# Patient Record
Sex: MALE | Race: WHITE | NOT HISPANIC OR LATINO | Employment: OTHER | URBAN - METROPOLITAN AREA
[De-identification: names, ages, dates, MRNs, and addresses within clinical notes are randomized per-mention and may not be internally consistent; named-entity substitution may affect disease eponyms.]

---

## 2018-06-13 NOTE — PATIENT DISCUSSION
Posterior Capsular Fibrosis Counseling: The diagnosis of posterior capsular fibrosis (PCF), also referred to as a secondary cataract or posterior capsular opacification (PCO), was discussed with the patient. The patient understands that their symptoms and limitations are likely related to this condition. I have reviewed the risks, benefits and alternatives of  YAG laser surgery for the treatment of the fibrosis. The uncommon risk of an increase in intraocular pressure or a retinal detachment and their associated symptoms were explained to the patient. The patient understands and desires to proceed with the laser surgery to improve their vision for driving at night with less glare.

## 2019-10-01 ENCOUNTER — RETINA CONSULT (OUTPATIENT)
Dept: URBAN - METROPOLITAN AREA CLINIC 46 | Facility: CLINIC | Age: 69
End: 2019-10-01

## 2019-10-01 DIAGNOSIS — H35.62: ICD-10-CM

## 2019-10-01 DIAGNOSIS — H43.12: ICD-10-CM

## 2019-10-01 DIAGNOSIS — H35.09: ICD-10-CM

## 2019-10-01 DIAGNOSIS — E11.3592: ICD-10-CM

## 2019-10-01 DIAGNOSIS — E11.3491: ICD-10-CM

## 2019-10-01 DIAGNOSIS — Z79.4: ICD-10-CM

## 2019-10-01 PROCEDURE — 76512 OPH US DX B-SCAN: CPT

## 2019-10-01 PROCEDURE — 9222550 BILAT EXTENDED OPHTHALMOSCOPY, FIRST

## 2019-10-01 PROCEDURE — 67028 INJECTION EYE DRUG: CPT

## 2019-10-01 PROCEDURE — 92014 COMPRE OPH EXAM EST PT 1/>: CPT

## 2019-10-01 PROCEDURE — J2778DME LUCENTIS 0.3MG

## 2019-10-01 PROCEDURE — 92250 FUNDUS PHOTOGRAPHY W/I&R: CPT

## 2019-10-01 RX ORDER — PREDNISOLONE ACETATE 10 MG/ML: 1 SUSPENSION/ DROPS OPHTHALMIC

## 2019-10-01 RX ORDER — OFLOXACIN 3 MG/ML: 1 SOLUTION OPHTHALMIC

## 2019-10-01 ASSESSMENT — TONOMETRY
OD_IOP_MMHG: 17
OS_IOP_MMHG: 16

## 2019-10-01 ASSESSMENT — VISUAL ACUITY: OD_CC: 20/60+1

## 2019-10-07 ENCOUNTER — FOLLOW UP (OUTPATIENT)
Dept: URBAN - METROPOLITAN AREA CLINIC 39 | Facility: CLINIC | Age: 69
End: 2019-10-07

## 2019-10-07 DIAGNOSIS — Z79.4: ICD-10-CM

## 2019-10-07 DIAGNOSIS — E11.3592: ICD-10-CM

## 2019-10-07 DIAGNOSIS — H43.12: ICD-10-CM

## 2019-10-07 DIAGNOSIS — E11.3491: ICD-10-CM

## 2019-10-07 DIAGNOSIS — H35.09: ICD-10-CM

## 2019-10-07 DIAGNOSIS — H35.62: ICD-10-CM

## 2019-10-07 PROCEDURE — 92134 CPTRZ OPH DX IMG PST SGM RTA: CPT

## 2019-10-07 PROCEDURE — 99214 OFFICE O/P EST MOD 30 MIN: CPT

## 2019-10-07 ASSESSMENT — TONOMETRY
OD_IOP_MMHG: 14
OS_IOP_MMHG: 21

## 2019-10-07 ASSESSMENT — VISUAL ACUITY: OD_CC: 20/60

## 2019-10-10 ENCOUNTER — SURGERY/PROCEDURE (OUTPATIENT)
Dept: URBAN - METROPOLITAN AREA CLINIC 46 | Facility: CLINIC | Age: 69
End: 2019-10-10

## 2019-10-10 DIAGNOSIS — H43.12: ICD-10-CM

## 2019-10-10 DIAGNOSIS — E11.3592: ICD-10-CM

## 2019-10-10 DIAGNOSIS — Z79.4: ICD-10-CM

## 2019-10-10 PROCEDURE — 67040 LASER TREATMENT OF RETINA: CPT

## 2019-10-11 ENCOUNTER — 1 DAY POST-OP (OUTPATIENT)
Dept: URBAN - METROPOLITAN AREA CLINIC 43 | Facility: CLINIC | Age: 69
End: 2019-10-11

## 2019-10-11 DIAGNOSIS — Z98.890: ICD-10-CM

## 2019-10-11 DIAGNOSIS — H43.12: ICD-10-CM

## 2019-10-11 PROCEDURE — 99024 POSTOP FOLLOW-UP VISIT: CPT

## 2019-10-11 ASSESSMENT — VISUAL ACUITY: OS_SC: 20/400

## 2019-10-11 ASSESSMENT — TONOMETRY: OS_IOP_MMHG: 09

## 2019-10-18 ENCOUNTER — POST-OP (OUTPATIENT)
Dept: URBAN - METROPOLITAN AREA CLINIC 43 | Facility: CLINIC | Age: 69
End: 2019-10-18

## 2019-10-18 DIAGNOSIS — Z98.890: ICD-10-CM

## 2019-10-18 PROCEDURE — 99024 POSTOP FOLLOW-UP VISIT: CPT

## 2019-10-18 ASSESSMENT — TONOMETRY: OS_IOP_MMHG: 20

## 2019-10-18 ASSESSMENT — VISUAL ACUITY
OD_CC: 20/70+2
OS_CC: 20/80

## 2019-11-26 ENCOUNTER — POST-OP (OUTPATIENT)
Dept: URBAN - METROPOLITAN AREA CLINIC 46 | Facility: CLINIC | Age: 69
End: 2019-11-26

## 2019-11-26 DIAGNOSIS — H43.12: ICD-10-CM

## 2019-11-26 DIAGNOSIS — H04.123: ICD-10-CM

## 2019-11-26 PROCEDURE — 99024 POSTOP FOLLOW-UP VISIT: CPT

## 2019-11-26 PROCEDURE — 92134 CPTRZ OPH DX IMG PST SGM RTA: CPT

## 2019-11-26 ASSESSMENT — TONOMETRY: OS_IOP_MMHG: 14

## 2019-11-26 ASSESSMENT — VISUAL ACUITY
OS_PH: 20/50
OS_CC: 20/70

## 2020-01-07 ENCOUNTER — POST-OP (OUTPATIENT)
Dept: URBAN - METROPOLITAN AREA CLINIC 46 | Facility: CLINIC | Age: 70
End: 2020-01-07

## 2020-01-07 DIAGNOSIS — H43.12: ICD-10-CM

## 2020-01-07 DIAGNOSIS — H04.123: ICD-10-CM

## 2020-01-07 PROCEDURE — 92134 CPTRZ OPH DX IMG PST SGM RTA: CPT

## 2020-01-07 PROCEDURE — 99024 POSTOP FOLLOW-UP VISIT: CPT

## 2020-01-07 ASSESSMENT — VISUAL ACUITY
OS_PH: 20/50+2
OS_CC: 20/80+2

## 2020-01-07 ASSESSMENT — TONOMETRY: OS_IOP_MMHG: 15

## 2021-02-12 NOTE — PATIENT DISCUSSION
The patient feels that the cataract is significantly impacting daily activities and has elected cataract surgery. The risks, benefits, and alternatives to surgery were discussed. The patient elects to proceed with surgery. patient elects basic shani.

## 2021-03-04 NOTE — PATIENT DISCUSSION
BLOCK ENDOCOAT.
Good postoperative appearance.
H&P essentially unchanged.
Indications, risks, benefits and alternatives to YAG capsulotomy discussed with patient. Questions answered. Educational handout given.
Patient elects to proceed with YAG capsulotomy.
Patient is cleared for anesthesia.
Patient made aware of 24/7 emergency services.
Symptoms of Retinal tear and detachment reviewed. Patient understands to call immediately with any such symptoms.
The patient feels that the cataract is significantly impacting daily activities and has elected cataract surgery. The risks, benefits, and alternatives to surgery were discussed. The patient elects to proceed with surgery. patient elects basic shani.
The types of intraocular lenses were reviewed with the patient along with a discussion of their various strengths and weaknesses.
basic OD goal shani  YAG OS same day.
negative...

## 2021-03-24 NOTE — PROCEDURE NOTE: SURGICAL
<p>Prior to commencing surgery patient identification, surgical procedure, site, and side were confirmed by Dr. Sandy Kaur. Following topical proparacaine anesthesia, the patient was positioned at the YAG laser, a contact lens coupled to the cornea with methylcellulose and an axial posterior capsulotomy performed without complication using 3.2 Mj x 17. Excess methylcellulose was washed from the eye, one drop of Alphagan was instilled and the patient returned to the holding area having tolerated the procedure well and without complication. </p><p> [id number]</p>

## 2021-06-08 ENCOUNTER — ESTABLISHED COMPREHENSIVE EXAM (OUTPATIENT)
Dept: URBAN - METROPOLITAN AREA CLINIC 46 | Facility: CLINIC | Age: 71
End: 2021-06-08

## 2021-06-08 DIAGNOSIS — H43.12: ICD-10-CM

## 2021-06-08 DIAGNOSIS — H35.62: ICD-10-CM

## 2021-06-08 DIAGNOSIS — E11.3592: ICD-10-CM

## 2021-06-08 DIAGNOSIS — E11.3491: ICD-10-CM

## 2021-06-08 DIAGNOSIS — Z79.4: ICD-10-CM

## 2021-06-08 DIAGNOSIS — H35.09: ICD-10-CM

## 2021-06-08 PROCEDURE — 92250 FUNDUS PHOTOGRAPHY W/I&R: CPT

## 2021-06-08 PROCEDURE — 99214 OFFICE O/P EST MOD 30 MIN: CPT

## 2021-06-08 PROCEDURE — 92273 FULL FIELD ERG W/I&R: CPT

## 2021-06-08 ASSESSMENT — TONOMETRY
OS_IOP_MMHG: 20
OD_IOP_MMHG: 19

## 2021-06-08 ASSESSMENT — VISUAL ACUITY
OS_PH: 20/60
OS_CC: 20/200-1
OD_CC: 20/40-2

## 2021-06-11 ENCOUNTER — CATARACT CONSULT (OUTPATIENT)
Dept: URBAN - METROPOLITAN AREA CLINIC 46 | Facility: CLINIC | Age: 71
End: 2021-06-11

## 2021-06-11 DIAGNOSIS — H40.1131: ICD-10-CM

## 2021-06-11 DIAGNOSIS — H04.123: ICD-10-CM

## 2021-06-11 DIAGNOSIS — H25.813: ICD-10-CM

## 2021-06-11 PROCEDURE — 92004 COMPRE OPH EXAM NEW PT 1/>: CPT

## 2021-06-11 PROCEDURE — 92133 CPTRZD OPH DX IMG PST SGM ON: CPT

## 2021-06-11 PROCEDURE — 92020 GONIOSCOPY: CPT

## 2021-06-11 PROCEDURE — 92136TC INTERFEROMETRY - TECHNICAL COMPONENT

## 2021-06-11 RX ORDER — MOXIFLOXACIN OPHTHALMIC 5 MG/ML: 1 SOLUTION/ DROPS OPHTHALMIC

## 2021-06-11 RX ORDER — PREDNISOLONE ACETATE 10 MG/ML: 1 SUSPENSION/ DROPS OPHTHALMIC

## 2021-06-11 ASSESSMENT — VISUAL ACUITY
OS_SC: J12
OS_AM: 20/40
OD_RAM: 20/30
OD_SC: 20/400
OD_CC: 20/40-1
OD_SC: J12
OS_SC: 20/400
OS_CC: 20/80
OD_BAT: 20/400
OD_CC: J2
OS_CC: J4
OS_BAT: 20/400

## 2021-06-11 ASSESSMENT — TONOMETRY
OD_IOP_MMHG: 18
OS_IOP_MMHG: 19

## 2021-07-12 ENCOUNTER — PRE-OP/H&P (OUTPATIENT)
Dept: URBAN - METROPOLITAN AREA CLINIC 39 | Facility: CLINIC | Age: 71
End: 2021-07-12

## 2021-07-12 ENCOUNTER — SURGERY/PROCEDURE (OUTPATIENT)
Dept: URBAN - METROPOLITAN AREA CLINIC 46 | Facility: CLINIC | Age: 71
End: 2021-07-12

## 2021-07-12 DIAGNOSIS — H40.1111: ICD-10-CM

## 2021-07-12 DIAGNOSIS — H25.813: ICD-10-CM

## 2021-07-12 DIAGNOSIS — H25.811: ICD-10-CM

## 2021-07-12 DIAGNOSIS — H04.123: ICD-10-CM

## 2021-07-12 DIAGNOSIS — H40.1131: ICD-10-CM

## 2021-07-12 DIAGNOSIS — H21.81: ICD-10-CM

## 2021-07-12 PROCEDURE — 99211HP H&P OFFICE/OUTPATIENT VISIT, EST

## 2021-07-12 PROCEDURE — 66984 XCAPSL CTRC RMVL W/O ECP: CPT

## 2021-07-12 PROCEDURE — 0191TW INJECT W ISTENT, AQUEOUS SHUNT

## 2021-07-13 ENCOUNTER — CATARACT POST-OP 1-DAY (OUTPATIENT)
Dept: URBAN - METROPOLITAN AREA CLINIC 46 | Facility: CLINIC | Age: 71
End: 2021-07-13

## 2021-07-13 DIAGNOSIS — Z96.1: ICD-10-CM

## 2021-07-13 PROCEDURE — 99024 POSTOP FOLLOW-UP VISIT: CPT

## 2021-07-13 ASSESSMENT — VISUAL ACUITY
OS_SC: 20/80
OD_SC: J6
OD_SC: 20/40-1
OS_SC: J10

## 2021-07-13 ASSESSMENT — TONOMETRY: OD_IOP_MMHG: 16

## 2021-07-19 ENCOUNTER — SURGERY/PROCEDURE (OUTPATIENT)
Dept: URBAN - METROPOLITAN AREA CLINIC 36 | Facility: CLINIC | Age: 71
End: 2021-07-19

## 2021-07-19 ENCOUNTER — POST OP/EVAL OF SECOND EYE (OUTPATIENT)
Dept: URBAN - METROPOLITAN AREA CLINIC 39 | Facility: CLINIC | Age: 71
End: 2021-07-19

## 2021-07-19 DIAGNOSIS — H40.1121: ICD-10-CM

## 2021-07-19 DIAGNOSIS — H25.812: ICD-10-CM

## 2021-07-19 DIAGNOSIS — Z96.1: ICD-10-CM

## 2021-07-19 PROCEDURE — 0376T INSERTION OF ANTERIOR SEGMENT AQUEOUS DRAINAGE DEVICE, WITHOUT EXTRAOCULAR RESERVOIR, INTERNAL APPROACH, INTO THE TRABECULAR MESHWORK; EACH ADDITIONAL DEVICE INSERTION (LIST SEPARATELY IN ADDITION TO CODE FOR PRIMARY PROCEDURE): CPT

## 2021-07-19 PROCEDURE — 0191TW INJECT W ISTENT, AQUEOUS SHUNT

## 2021-07-19 PROCEDURE — 66984 XCAPSL CTRC RMVL W/O ECP: CPT

## 2021-07-19 PROCEDURE — 99211HP H&P OFFICE/OUTPATIENT VISIT, EST

## 2021-07-19 ASSESSMENT — VISUAL ACUITY
OD_PH: 20/40-1
OD_SC: 20/60-2

## 2021-07-19 ASSESSMENT — TONOMETRY: OD_IOP_MMHG: 19

## 2021-07-20 ENCOUNTER — CATARACT POST-OP 1-DAY (OUTPATIENT)
Dept: URBAN - METROPOLITAN AREA CLINIC 46 | Facility: CLINIC | Age: 71
End: 2021-07-20

## 2021-07-20 DIAGNOSIS — Z96.1: ICD-10-CM

## 2021-07-20 PROCEDURE — 99024 POSTOP FOLLOW-UP VISIT: CPT

## 2021-07-20 ASSESSMENT — TONOMETRY: OS_IOP_MMHG: 22

## 2021-07-20 ASSESSMENT — VISUAL ACUITY
OS_SC: 20/100
OS_SC: <J10
OD_SC: J6
OD_SC: 20/60-2

## 2021-07-27 ENCOUNTER — POST-OP CATARACT (OUTPATIENT)
Dept: URBAN - METROPOLITAN AREA CLINIC 46 | Facility: CLINIC | Age: 71
End: 2021-07-27

## 2021-07-27 ENCOUNTER — ESTABLISHED PATIENT (OUTPATIENT)
Dept: URBAN - METROPOLITAN AREA CLINIC 36 | Facility: CLINIC | Age: 71
End: 2021-07-27

## 2021-07-27 DIAGNOSIS — H43.11: ICD-10-CM

## 2021-07-27 DIAGNOSIS — H33.41: ICD-10-CM

## 2021-07-27 DIAGNOSIS — H43.12: ICD-10-CM

## 2021-07-27 DIAGNOSIS — E11.3591: ICD-10-CM

## 2021-07-27 DIAGNOSIS — H43.811: ICD-10-CM

## 2021-07-27 DIAGNOSIS — Z96.1: ICD-10-CM

## 2021-07-27 DIAGNOSIS — Z79.4: ICD-10-CM

## 2021-07-27 DIAGNOSIS — H35.033: ICD-10-CM

## 2021-07-27 DIAGNOSIS — E11.3592: ICD-10-CM

## 2021-07-27 PROCEDURE — 76512 OPH US DX B-SCAN: CPT

## 2021-07-27 PROCEDURE — 99214 OFFICE O/P EST MOD 30 MIN: CPT

## 2021-07-27 PROCEDURE — 99024 POSTOP FOLLOW-UP VISIT: CPT

## 2021-07-27 PROCEDURE — 92250 FUNDUS PHOTOGRAPHY W/I&R: CPT

## 2021-07-27 PROCEDURE — J2778DME LUCENTIS 0.3MG

## 2021-07-27 PROCEDURE — 67028 INJECTION EYE DRUG: CPT

## 2021-07-27 RX ORDER — MOXIFLOXACIN OPHTHALMIC 5 MG/ML
1 SOLUTION/ DROPS OPHTHALMIC
Start: 2021-07-27

## 2021-07-27 RX ORDER — OFLOXACIN 3 MG/ML: 1 SOLUTION OPHTHALMIC

## 2021-07-27 ASSESSMENT — VISUAL ACUITY
OD_SC: 20/70-1
OS_SC: 20/100
OD_SC: <J10
OS_SC: J10
OS_SC: 20/100
OD_SC: 20/70-1
OD_SC: J10
OS_SC: J10

## 2021-07-27 ASSESSMENT — TONOMETRY
OD_IOP_MMHG: 19
OS_IOP_MMHG: 18

## 2021-07-28 ENCOUNTER — PRE-OP/H&P (OUTPATIENT)
Dept: URBAN - METROPOLITAN AREA CLINIC 39 | Facility: CLINIC | Age: 71
End: 2021-07-28

## 2021-07-28 DIAGNOSIS — E11.3592: ICD-10-CM

## 2021-07-28 DIAGNOSIS — H35.033: ICD-10-CM

## 2021-07-28 DIAGNOSIS — Z79.4: ICD-10-CM

## 2021-07-28 DIAGNOSIS — H43.811: ICD-10-CM

## 2021-07-28 DIAGNOSIS — H35.09: ICD-10-CM

## 2021-07-28 DIAGNOSIS — Z96.1: ICD-10-CM

## 2021-07-28 DIAGNOSIS — H33.41: ICD-10-CM

## 2021-07-28 DIAGNOSIS — E11.3591: ICD-10-CM

## 2021-07-28 DIAGNOSIS — H43.12: ICD-10-CM

## 2021-07-28 DIAGNOSIS — S05.02XA: ICD-10-CM

## 2021-07-28 DIAGNOSIS — H43.11: ICD-10-CM

## 2021-07-28 PROCEDURE — 99211HP H&P OFFICE/OUTPATIENT VISIT, EST

## 2021-07-29 ENCOUNTER — SURGERY/PROCEDURE (OUTPATIENT)
Dept: URBAN - METROPOLITAN AREA CLINIC 36 | Facility: CLINIC | Age: 71
End: 2021-07-29

## 2021-07-29 DIAGNOSIS — H43.11: ICD-10-CM

## 2021-07-29 DIAGNOSIS — H33.41: ICD-10-CM

## 2021-07-29 PROCEDURE — 67113 REPAIR RETINAL DETACH CPLX: CPT

## 2021-07-30 ENCOUNTER — 1 DAY POST-OP (OUTPATIENT)
Dept: URBAN - METROPOLITAN AREA CLINIC 43 | Facility: CLINIC | Age: 71
End: 2021-07-30

## 2021-07-30 DIAGNOSIS — H43.11: ICD-10-CM

## 2021-07-30 DIAGNOSIS — Z96.1: ICD-10-CM

## 2021-07-30 DIAGNOSIS — H33.41: ICD-10-CM

## 2021-07-30 PROCEDURE — 99024 POSTOP FOLLOW-UP VISIT: CPT

## 2021-07-30 ASSESSMENT — TONOMETRY: OD_IOP_MMHG: 16

## 2021-07-30 ASSESSMENT — VISUAL ACUITY: OD_SC: CF 2FT

## 2021-08-06 ENCOUNTER — POST-OP (OUTPATIENT)
Dept: URBAN - METROPOLITAN AREA CLINIC 43 | Facility: CLINIC | Age: 71
End: 2021-08-06

## 2021-08-06 DIAGNOSIS — H43.12: ICD-10-CM

## 2021-08-06 DIAGNOSIS — H35.033: ICD-10-CM

## 2021-08-06 DIAGNOSIS — Z79.4: ICD-10-CM

## 2021-08-06 DIAGNOSIS — H33.41: ICD-10-CM

## 2021-08-06 DIAGNOSIS — E11.3591: ICD-10-CM

## 2021-08-06 PROCEDURE — 99024 POSTOP FOLLOW-UP VISIT: CPT

## 2021-08-06 PROCEDURE — 92134 CPTRZ OPH DX IMG PST SGM RTA: CPT

## 2021-08-06 PROCEDURE — 92250 FUNDUS PHOTOGRAPHY W/I&R: CPT

## 2021-08-06 ASSESSMENT — TONOMETRY: OD_IOP_MMHG: 18

## 2021-08-06 ASSESSMENT — VISUAL ACUITY: OD_SC: 20/50+2

## 2021-08-13 ENCOUNTER — POST-OP CATARACT (OUTPATIENT)
Dept: URBAN - METROPOLITAN AREA CLINIC 46 | Facility: CLINIC | Age: 71
End: 2021-08-13

## 2021-08-13 DIAGNOSIS — Z96.1: ICD-10-CM

## 2021-08-13 PROCEDURE — 99024 POSTOP FOLLOW-UP VISIT: CPT

## 2021-08-13 ASSESSMENT — VISUAL ACUITY
OS_SC: >J12
OD_SC: >J12
OS_SC: 20/60+2
OD_SC: 20/40-1

## 2021-08-13 ASSESSMENT — TONOMETRY
OD_IOP_MMHG: 18
OS_IOP_MMHG: 19

## 2021-09-20 ENCOUNTER — POST-OP (OUTPATIENT)
Dept: URBAN - METROPOLITAN AREA CLINIC 43 | Facility: CLINIC | Age: 71
End: 2021-09-20

## 2021-09-20 DIAGNOSIS — Z79.4: ICD-10-CM

## 2021-09-20 DIAGNOSIS — E11.3513: ICD-10-CM

## 2021-09-20 DIAGNOSIS — H35.033: ICD-10-CM

## 2021-09-20 DIAGNOSIS — H33.41: ICD-10-CM

## 2021-09-20 DIAGNOSIS — H43.12: ICD-10-CM

## 2021-09-20 PROCEDURE — J2778DME LUCENTIS 0.3MG

## 2021-09-20 PROCEDURE — 92202 OPSCPY EXTND ON/MAC DRAW: CPT

## 2021-09-20 PROCEDURE — 99214 OFFICE O/P EST MOD 30 MIN: CPT

## 2021-09-20 PROCEDURE — 6702850 BILATERAL INTRAVITREAL INJECTION

## 2021-09-20 PROCEDURE — 92134 CPTRZ OPH DX IMG PST SGM RTA: CPT

## 2021-09-20 ASSESSMENT — TONOMETRY
OD_IOP_MMHG: 19
OS_IOP_MMHG: 20

## 2021-09-20 ASSESSMENT — VISUAL ACUITY
OD_PH: 20/70--
OD_SC: 20/100+
OS_SC: 20/100

## 2021-10-26 ENCOUNTER — ESTABLISHED PATIENT (OUTPATIENT)
Dept: URBAN - METROPOLITAN AREA CLINIC 46 | Facility: CLINIC | Age: 71
End: 2021-10-26

## 2021-10-26 DIAGNOSIS — Z79.4: ICD-10-CM

## 2021-10-26 DIAGNOSIS — H35.033: ICD-10-CM

## 2021-10-26 DIAGNOSIS — E11.3512: ICD-10-CM

## 2021-10-26 DIAGNOSIS — E11.3511: ICD-10-CM

## 2021-10-26 PROCEDURE — 6702850 BILATERAL INTRAVITREAL INJECTION

## 2021-10-26 PROCEDURE — 92273 FULL FIELD ERG W/I&R: CPT

## 2021-10-26 PROCEDURE — 99213 OFFICE O/P EST LOW 20 MIN: CPT

## 2021-10-26 PROCEDURE — J2778DME LUCENTIS 0.3MG

## 2021-10-26 PROCEDURE — 92134 CPTRZ OPH DX IMG PST SGM RTA: CPT

## 2021-10-26 ASSESSMENT — TONOMETRY
OS_IOP_MMHG: 16
OD_IOP_MMHG: 15

## 2021-10-26 ASSESSMENT — VISUAL ACUITY
OD_CC: 20/40-1
OS_CC: 20/200

## 2021-11-18 ENCOUNTER — FOLLOW UP (OUTPATIENT)
Dept: URBAN - METROPOLITAN AREA CLINIC 46 | Facility: CLINIC | Age: 71
End: 2021-11-18

## 2021-11-18 DIAGNOSIS — H40.1131: ICD-10-CM

## 2021-11-18 DIAGNOSIS — H35.033: ICD-10-CM

## 2021-11-18 PROCEDURE — 76514 ECHO EXAM OF EYE THICKNESS: CPT

## 2021-11-18 PROCEDURE — 92012 INTRM OPH EXAM EST PATIENT: CPT

## 2021-11-18 PROCEDURE — 92083 EXTENDED VISUAL FIELD XM: CPT

## 2021-11-18 ASSESSMENT — TONOMETRY
OD_IOP_MMHG: 13
OS_IOP_MMHG: 15

## 2021-11-18 ASSESSMENT — VISUAL ACUITY
OD_CC: 20/40-1
OS_PH: 20/70
OS_CC: 20/200+1

## 2021-11-18 ASSESSMENT — PACHYMETRY
OS_CT_UM: 580
OD_CT_UM: 550

## 2021-12-07 ENCOUNTER — ESTABLISHED PATIENT (OUTPATIENT)
Dept: URBAN - METROPOLITAN AREA CLINIC 46 | Facility: CLINIC | Age: 71
End: 2021-12-07

## 2021-12-07 DIAGNOSIS — E11.3511: ICD-10-CM

## 2021-12-07 DIAGNOSIS — H35.033: ICD-10-CM

## 2021-12-07 DIAGNOSIS — E11.3512: ICD-10-CM

## 2021-12-07 DIAGNOSIS — H43.12: ICD-10-CM

## 2021-12-07 DIAGNOSIS — Z79.4: ICD-10-CM

## 2021-12-07 DIAGNOSIS — H33.41: ICD-10-CM

## 2021-12-07 PROCEDURE — 6702850 BILATERAL INTRAVITREAL INJECTION

## 2021-12-07 PROCEDURE — 99213 OFFICE O/P EST LOW 20 MIN: CPT

## 2021-12-07 PROCEDURE — J2778DME LUCENTIS 0.3MG

## 2021-12-07 PROCEDURE — 92250 FUNDUS PHOTOGRAPHY W/I&R: CPT

## 2021-12-07 ASSESSMENT — VISUAL ACUITY
OD_SC: 20/30-2
OS_SC: 20/30-2

## 2021-12-07 ASSESSMENT — TONOMETRY
OS_IOP_MMHG: 20
OD_IOP_MMHG: 21

## 2021-12-22 ENCOUNTER — FOLLOW UP (OUTPATIENT)
Dept: URBAN - METROPOLITAN AREA CLINIC 46 | Facility: CLINIC | Age: 71
End: 2021-12-22

## 2021-12-22 DIAGNOSIS — H35.033: ICD-10-CM

## 2021-12-22 DIAGNOSIS — H40.1131: ICD-10-CM

## 2021-12-22 PROCEDURE — 92012 INTRM OPH EXAM EST PATIENT: CPT

## 2021-12-22 ASSESSMENT — VISUAL ACUITY
OS_SC: 20/30-2
OD_SC: 20/40-2

## 2021-12-22 ASSESSMENT — TONOMETRY
OS_IOP_MMHG: 16
OD_IOP_MMHG: 15

## 2022-01-25 ENCOUNTER — ESTABLISHED PATIENT (OUTPATIENT)
Dept: URBAN - METROPOLITAN AREA CLINIC 46 | Facility: CLINIC | Age: 72
End: 2022-01-25

## 2022-01-25 DIAGNOSIS — E11.3511: ICD-10-CM

## 2022-01-25 DIAGNOSIS — Z79.4: ICD-10-CM

## 2022-01-25 DIAGNOSIS — H43.12: ICD-10-CM

## 2022-01-25 DIAGNOSIS — H35.033: ICD-10-CM

## 2022-01-25 DIAGNOSIS — H33.41: ICD-10-CM

## 2022-01-25 DIAGNOSIS — E11.3512: ICD-10-CM

## 2022-01-25 PROCEDURE — 92134 CPTRZ OPH DX IMG PST SGM RTA: CPT

## 2022-01-25 PROCEDURE — 92273 FULL FIELD ERG W/I&R: CPT

## 2022-01-25 PROCEDURE — J2778DME LUCENTIS 0.3MG

## 2022-01-25 PROCEDURE — 99213 OFFICE O/P EST LOW 20 MIN: CPT

## 2022-01-25 PROCEDURE — 6702850 BILATERAL INTRAVITREAL INJECTION

## 2022-01-25 ASSESSMENT — VISUAL ACUITY
OD_CC: 20/40+2
OS_CC: 20/40-2

## 2022-01-25 ASSESSMENT — TONOMETRY
OD_IOP_MMHG: 19
OS_IOP_MMHG: 19

## 2022-03-15 ENCOUNTER — ESTABLISHED PATIENT (OUTPATIENT)
Dept: URBAN - METROPOLITAN AREA CLINIC 46 | Facility: CLINIC | Age: 72
End: 2022-03-15

## 2022-03-15 DIAGNOSIS — H40.1131: ICD-10-CM

## 2022-03-15 DIAGNOSIS — E11.3511: ICD-10-CM

## 2022-03-15 DIAGNOSIS — H35.09: ICD-10-CM

## 2022-03-15 DIAGNOSIS — Z79.4: ICD-10-CM

## 2022-03-15 DIAGNOSIS — H35.033: ICD-10-CM

## 2022-03-15 DIAGNOSIS — H33.41: ICD-10-CM

## 2022-03-15 DIAGNOSIS — E11.3512: ICD-10-CM

## 2022-03-15 PROCEDURE — J2778DME LUCENTIS 0.3MG

## 2022-03-15 PROCEDURE — 92134 CPTRZ OPH DX IMG PST SGM RTA: CPT

## 2022-03-15 PROCEDURE — 99214 OFFICE O/P EST MOD 30 MIN: CPT

## 2022-03-15 PROCEDURE — 67028 INJECTION EYE DRUG: CPT

## 2022-03-15 ASSESSMENT — VISUAL ACUITY
OS_CC: 20/25-1
OD_CC: 20/30-1

## 2022-03-15 ASSESSMENT — TONOMETRY
OS_IOP_MMHG: 19
OD_IOP_MMHG: 15

## 2022-05-10 ENCOUNTER — ESTABLISHED PATIENT (OUTPATIENT)
Dept: URBAN - METROPOLITAN AREA CLINIC 46 | Facility: CLINIC | Age: 72
End: 2022-05-10

## 2022-05-10 DIAGNOSIS — H43.12: ICD-10-CM

## 2022-05-10 DIAGNOSIS — H35.09: ICD-10-CM

## 2022-05-10 DIAGNOSIS — Z79.4: ICD-10-CM

## 2022-05-10 DIAGNOSIS — E11.3513: ICD-10-CM

## 2022-05-10 DIAGNOSIS — H35.033: ICD-10-CM

## 2022-05-10 DIAGNOSIS — H33.41: ICD-10-CM

## 2022-05-10 PROCEDURE — 92250 FUNDUS PHOTOGRAPHY W/I&R: CPT

## 2022-05-10 PROCEDURE — 99214 OFFICE O/P EST MOD 30 MIN: CPT

## 2022-05-10 PROCEDURE — 6702850 BILATERAL INTRAVITREAL INJECTION

## 2022-05-10 PROCEDURE — 92273 FULL FIELD ERG W/I&R: CPT

## 2022-05-10 PROCEDURE — J0178PRE EYLEA PREFILLED SYRINGE

## 2022-05-10 ASSESSMENT — VISUAL ACUITY
OS_CC: 20/30-2
OD_CC: 20/50-2

## 2022-05-10 ASSESSMENT — TONOMETRY
OD_IOP_MMHG: 19
OS_IOP_MMHG: 19

## 2022-05-19 ENCOUNTER — FOLLOW UP (OUTPATIENT)
Dept: URBAN - METROPOLITAN AREA CLINIC 46 | Facility: CLINIC | Age: 72
End: 2022-05-19

## 2022-05-19 DIAGNOSIS — H40.1131: ICD-10-CM

## 2022-05-19 PROCEDURE — 92012 INTRM OPH EXAM EST PATIENT: CPT

## 2022-05-19 PROCEDURE — 92133 CPTRZD OPH DX IMG PST SGM ON: CPT

## 2022-05-19 ASSESSMENT — VISUAL ACUITY
OD_CC: 20/50
OS_CC: 20/30-2

## 2022-05-19 ASSESSMENT — TONOMETRY
OD_IOP_MMHG: 19
OS_IOP_MMHG: 18

## 2022-06-21 ENCOUNTER — CLINIC PROCEDURE ONLY (OUTPATIENT)
Dept: URBAN - METROPOLITAN AREA CLINIC 46 | Facility: CLINIC | Age: 72
End: 2022-06-21

## 2022-06-21 DIAGNOSIS — E11.3513: ICD-10-CM

## 2022-06-21 DIAGNOSIS — H35.033: ICD-10-CM

## 2022-06-21 DIAGNOSIS — Z79.4: ICD-10-CM

## 2022-06-21 PROCEDURE — 92250 FUNDUS PHOTOGRAPHY W/I&R: CPT

## 2022-06-21 PROCEDURE — 6702850 BILATERAL INTRAVITREAL INJECTION

## 2022-06-21 PROCEDURE — J0178PRE EYLEA PREFILLED SYRINGE

## 2022-06-21 ASSESSMENT — TONOMETRY
OD_IOP_MMHG: 17
OS_IOP_MMHG: 19

## 2022-06-21 ASSESSMENT — VISUAL ACUITY
OD_CC: 20/60+2
OS_CC: 20/40

## 2022-07-19 ENCOUNTER — ESTABLISHED PATIENT (OUTPATIENT)
Dept: URBAN - METROPOLITAN AREA CLINIC 46 | Facility: CLINIC | Age: 72
End: 2022-07-19

## 2022-07-19 DIAGNOSIS — H33.41: ICD-10-CM

## 2022-07-19 DIAGNOSIS — H43.12: ICD-10-CM

## 2022-07-19 DIAGNOSIS — Z79.4: ICD-10-CM

## 2022-07-19 DIAGNOSIS — E11.3513: ICD-10-CM

## 2022-07-19 DIAGNOSIS — H35.033: ICD-10-CM

## 2022-07-19 DIAGNOSIS — H04.123: ICD-10-CM

## 2022-07-19 DIAGNOSIS — H35.09: ICD-10-CM

## 2022-07-19 PROCEDURE — 99213 OFFICE O/P EST LOW 20 MIN: CPT

## 2022-07-19 PROCEDURE — 6702850 BILATERAL INTRAVITREAL INJECTION

## 2022-07-19 PROCEDURE — 92250 FUNDUS PHOTOGRAPHY W/I&R: CPT

## 2022-07-19 PROCEDURE — J0178PRE EYLEA PREFILLED SYRINGE

## 2022-07-19 ASSESSMENT — TONOMETRY
OD_IOP_MMHG: 16
OS_IOP_MMHG: 17

## 2022-07-19 ASSESSMENT — VISUAL ACUITY
OS_CC: 20/25-2
OD_CC: 20/60-2

## 2022-08-16 ENCOUNTER — CLINIC PROCEDURE ONLY (OUTPATIENT)
Dept: URBAN - METROPOLITAN AREA CLINIC 46 | Facility: CLINIC | Age: 72
End: 2022-08-16

## 2022-08-16 DIAGNOSIS — E11.3513: ICD-10-CM

## 2022-08-16 DIAGNOSIS — Z79.4: ICD-10-CM

## 2022-08-16 PROCEDURE — 92273 FULL FIELD ERG W/I&R: CPT

## 2022-08-16 PROCEDURE — 6702850 BILATERAL INTRAVITREAL INJECTION

## 2022-08-16 PROCEDURE — 92250 FUNDUS PHOTOGRAPHY W/I&R: CPT

## 2022-08-16 PROCEDURE — J0178PRE EYLEA PREFILLED SYRINGE

## 2022-08-16 ASSESSMENT — TONOMETRY
OD_IOP_MMHG: 17
OS_IOP_MMHG: 18

## 2022-08-16 ASSESSMENT — VISUAL ACUITY
OS_CC: 20/30
OD_CC: 20/80-1

## 2022-08-19 NOTE — PATIENT DISCUSSION
Dry Eye Syndrome Counseling: I have discussed the diagnosis and the pathophysiology of this disease with the patient. . Vision may be limited by dry eye, and symptoms exacerbated by environmental factors such as smoke, wind, or prolonged eye use. Treatment options include, but are not limited to, artificial tears, punctal plugs, topical and cyclosporine  I stressed the importance of compliance with treatment. Patient notified. He has an appointment on Monday, will schedule 3 month A1c when he is here.

## 2022-09-13 ENCOUNTER — ESTABLISHED PATIENT (OUTPATIENT)
Dept: URBAN - METROPOLITAN AREA CLINIC 46 | Facility: CLINIC | Age: 72
End: 2022-09-13

## 2022-09-13 DIAGNOSIS — H35.033: ICD-10-CM

## 2022-09-13 DIAGNOSIS — Z79.4: ICD-10-CM

## 2022-09-13 DIAGNOSIS — H40.1131: ICD-10-CM

## 2022-09-13 DIAGNOSIS — H33.41: ICD-10-CM

## 2022-09-13 DIAGNOSIS — H35.09: ICD-10-CM

## 2022-09-13 DIAGNOSIS — E11.3513: ICD-10-CM

## 2022-09-13 DIAGNOSIS — H43.12: ICD-10-CM

## 2022-09-13 PROCEDURE — 99213 OFFICE O/P EST LOW 20 MIN: CPT

## 2022-09-13 PROCEDURE — J0178PRE EYLEA PREFILLED SYRINGE

## 2022-09-13 PROCEDURE — 92250 FUNDUS PHOTOGRAPHY W/I&R: CPT

## 2022-09-13 PROCEDURE — 6702850 BILATERAL INTRAVITREAL INJECTION

## 2022-09-13 ASSESSMENT — VISUAL ACUITY
OD_CC: 20/60-1
OS_CC: 20/30+2
OD_PH: 20/60

## 2022-09-13 ASSESSMENT — TONOMETRY
OS_IOP_MMHG: 18
OD_IOP_MMHG: 18

## 2022-11-22 ENCOUNTER — ESTABLISHED PATIENT (OUTPATIENT)
Dept: URBAN - METROPOLITAN AREA CLINIC 43 | Facility: CLINIC | Age: 72
End: 2022-11-22

## 2022-11-22 DIAGNOSIS — H35.033: ICD-10-CM

## 2022-11-22 DIAGNOSIS — H43.12: ICD-10-CM

## 2022-11-22 DIAGNOSIS — H35.30: ICD-10-CM

## 2022-11-22 DIAGNOSIS — H33.41: ICD-10-CM

## 2022-11-22 DIAGNOSIS — H35.09: ICD-10-CM

## 2022-11-22 DIAGNOSIS — H04.123: ICD-10-CM

## 2022-11-22 DIAGNOSIS — Z79.4: ICD-10-CM

## 2022-11-22 DIAGNOSIS — E11.3513: ICD-10-CM

## 2022-11-22 PROCEDURE — 99213 OFFICE O/P EST LOW 20 MIN: CPT

## 2022-11-22 PROCEDURE — 67028 INJECTION EYE DRUG: CPT

## 2022-11-22 PROCEDURE — J0178PRE EYLEA PREFILLED SYRINGE

## 2022-11-22 PROCEDURE — 92250 FUNDUS PHOTOGRAPHY W/I&R: CPT

## 2022-11-22 ASSESSMENT — TONOMETRY
OS_IOP_MMHG: 16
OD_IOP_MMHG: 16

## 2022-11-22 ASSESSMENT — VISUAL ACUITY
OS_CC: 20/40+2
OD_CC: 20/60

## 2022-12-20 ENCOUNTER — PREPPED CHART (OUTPATIENT)
Dept: URBAN - METROPOLITAN AREA CLINIC 46 | Facility: CLINIC | Age: 72
End: 2022-12-20

## 2023-01-03 ENCOUNTER — CLINIC PROCEDURE ONLY (OUTPATIENT)
Dept: URBAN - METROPOLITAN AREA CLINIC 43 | Facility: CLINIC | Age: 73
End: 2023-01-03

## 2023-01-03 DIAGNOSIS — H35.033: ICD-10-CM

## 2023-01-03 DIAGNOSIS — Z79.4: ICD-10-CM

## 2023-01-03 DIAGNOSIS — E11.3513: ICD-10-CM

## 2023-01-03 PROCEDURE — 92273 FULL FIELD ERG W/I&R: CPT

## 2023-01-03 PROCEDURE — 92250 FUNDUS PHOTOGRAPHY W/I&R: CPT

## 2023-01-03 PROCEDURE — J0178PRE EYLEA PREFILLED SYRINGE

## 2023-01-03 PROCEDURE — 67028 INJECTION EYE DRUG: CPT

## 2023-01-03 ASSESSMENT — VISUAL ACUITY
OD_CC: 20/50+2
OS_CC: 20/40+2

## 2023-01-03 ASSESSMENT — TONOMETRY
OD_IOP_MMHG: 15
OS_IOP_MMHG: 14

## 2023-05-09 ENCOUNTER — CLINIC PROCEDURE ONLY (OUTPATIENT)
Dept: URBAN - METROPOLITAN AREA CLINIC 46 | Facility: CLINIC | Age: 73
End: 2023-05-09

## 2023-05-09 DIAGNOSIS — H33.41: ICD-10-CM

## 2023-05-09 DIAGNOSIS — H35.033: ICD-10-CM

## 2023-05-09 DIAGNOSIS — H35.09: ICD-10-CM

## 2023-05-09 DIAGNOSIS — E11.3513: ICD-10-CM

## 2023-05-09 DIAGNOSIS — Z79.4: ICD-10-CM

## 2023-05-09 DIAGNOSIS — H43.12: ICD-10-CM

## 2023-05-09 DIAGNOSIS — H35.30: ICD-10-CM

## 2023-05-09 PROCEDURE — 67028 INJECTION EYE DRUG: CPT

## 2023-05-09 PROCEDURE — J0178PRE EYLEA PREFILLED SYRINGE

## 2023-05-09 PROCEDURE — 92273 FULL FIELD ERG W/I&R: CPT

## 2023-05-09 PROCEDURE — 92250 FUNDUS PHOTOGRAPHY W/I&R: CPT

## 2023-05-09 ASSESSMENT — TONOMETRY
OD_IOP_MMHG: 17
OS_IOP_MMHG: 20

## 2023-05-09 ASSESSMENT — VISUAL ACUITY
OD_CC: 20/40
OS_CC: 20/20-1

## 2023-06-13 ENCOUNTER — ESTABLISHED PATIENT (OUTPATIENT)
Dept: URBAN - METROPOLITAN AREA CLINIC 46 | Facility: CLINIC | Age: 73
End: 2023-06-13

## 2023-06-13 DIAGNOSIS — Z79.4: ICD-10-CM

## 2023-06-13 DIAGNOSIS — H35.09: ICD-10-CM

## 2023-06-13 DIAGNOSIS — H43.12: ICD-10-CM

## 2023-06-13 DIAGNOSIS — H40.1131: ICD-10-CM

## 2023-06-13 DIAGNOSIS — E11.3513: ICD-10-CM

## 2023-06-13 DIAGNOSIS — H35.033: ICD-10-CM

## 2023-06-13 DIAGNOSIS — H35.30: ICD-10-CM

## 2023-06-13 DIAGNOSIS — H33.41: ICD-10-CM

## 2023-06-13 PROCEDURE — J0178PRE EYLEA PREFILLED SYRINGE

## 2023-06-13 PROCEDURE — 67028 INJECTION EYE DRUG: CPT

## 2023-06-13 PROCEDURE — 92250 FUNDUS PHOTOGRAPHY W/I&R: CPT

## 2023-06-13 PROCEDURE — 99213 OFFICE O/P EST LOW 20 MIN: CPT

## 2023-06-13 ASSESSMENT — TONOMETRY
OS_IOP_MMHG: 16
OD_IOP_MMHG: 13

## 2023-06-13 ASSESSMENT — VISUAL ACUITY
OS_SC: 20/25+1
OD_SC: 20/50

## 2023-06-22 NOTE — PATIENT DISCUSSION
COUNSELING:
DIABETES WITHOUT RETINOPATHY:  RETURN FOR FOLLOW-UP AS SCHEDULED FOR DILATED FUNDUS EXAM.
Diabetes without Retinopathy Counseling:  I have discussed with the patient the importance of controlling blood glucose to minimize the risk of developing retinal disease from diabetes. Explained the importance of annual dilated eye exams. Return for follow-up as scheduled.
General:
MILD DRY EYES: PRESCRIBED ARTIFICIAL TEARS BID - QID, OU RETURN FOR FOLLOW-UP AS SCHEDULED OR SOONER IF SYMPTOMS WORSEN.
Medications:
POSTERIOR CAPSULAR FIBROSIS, Od__:  CONTINUE TO MONITOR. RETURN AS DIRECTED.
POSTERIOR VITREOUS DETACHMENT, Os__:  NO HOLES. NO TEARS. RETURN FOR FOLLOW-UP AS SCHEDULED.
Posterior Capsular Fibrosis Counseling: The diagnosis of posterior capsular fibrosis (PCF), also referred to as a secondary cataract or posterior capsular opacification (PCO), was discussed with the patient. The patient understands and desires to monitor condition for now.
Stopped Today: Visine (tetrahydrozoline): drops: 0.05% 1 drop as needed into both eyes
Yes

## 2023-08-15 ENCOUNTER — ESTABLISHED PATIENT (OUTPATIENT)
Dept: URBAN - METROPOLITAN AREA CLINIC 46 | Facility: CLINIC | Age: 73
End: 2023-08-15

## 2023-08-15 DIAGNOSIS — H35.033: ICD-10-CM

## 2023-08-15 DIAGNOSIS — H40.1131: ICD-10-CM

## 2023-08-15 DIAGNOSIS — H33.41: ICD-10-CM

## 2023-08-15 DIAGNOSIS — E11.3513: ICD-10-CM

## 2023-08-15 DIAGNOSIS — H35.30: ICD-10-CM

## 2023-08-15 DIAGNOSIS — H35.09: ICD-10-CM

## 2023-08-15 DIAGNOSIS — Z79.4: ICD-10-CM

## 2023-08-15 DIAGNOSIS — H43.12: ICD-10-CM

## 2023-08-15 PROCEDURE — 99213 OFFICE O/P EST LOW 20 MIN: CPT

## 2023-08-15 PROCEDURE — 67028 INJECTION EYE DRUG: CPT

## 2023-08-15 PROCEDURE — 92273 FULL FIELD ERG W/I&R: CPT

## 2023-08-15 PROCEDURE — J0178PRE EYLEA PREFILLED SYRINGE

## 2023-08-15 PROCEDURE — 92250 FUNDUS PHOTOGRAPHY W/I&R: CPT

## 2023-08-15 ASSESSMENT — VISUAL ACUITY
OS_CC: 20/25+1
OD_CC: 20/40-1

## 2023-08-15 ASSESSMENT — TONOMETRY
OS_IOP_MMHG: 20
OD_IOP_MMHG: 17

## 2023-10-17 ENCOUNTER — ESTABLISHED PATIENT (OUTPATIENT)
Dept: URBAN - METROPOLITAN AREA CLINIC 46 | Facility: CLINIC | Age: 73
End: 2023-10-17

## 2023-10-17 DIAGNOSIS — H35.371: ICD-10-CM

## 2023-10-17 DIAGNOSIS — H33.41: ICD-10-CM

## 2023-10-17 DIAGNOSIS — H35.033: ICD-10-CM

## 2023-10-17 DIAGNOSIS — H35.30: ICD-10-CM

## 2023-10-17 DIAGNOSIS — H04.123: ICD-10-CM

## 2023-10-17 DIAGNOSIS — Z79.4: ICD-10-CM

## 2023-10-17 DIAGNOSIS — E11.3513: ICD-10-CM

## 2023-10-17 DIAGNOSIS — H35.09: ICD-10-CM

## 2023-10-17 DIAGNOSIS — H43.12: ICD-10-CM

## 2023-10-17 DIAGNOSIS — H40.1131: ICD-10-CM

## 2023-10-17 PROCEDURE — 99214 OFFICE O/P EST MOD 30 MIN: CPT | Mod: 25

## 2023-10-17 PROCEDURE — 67028 INJECTION EYE DRUG: CPT | Mod: 50,LT

## 2023-10-17 PROCEDURE — 67028 INJECTION EYE DRUG: CPT | Mod: 50,RT

## 2023-10-17 PROCEDURE — 92250 FUNDUS PHOTOGRAPHY W/I&R: CPT

## 2023-10-17 ASSESSMENT — VISUAL ACUITY
OD_CC: 20/50-1
OS_CC: 20/25+1

## 2023-10-17 ASSESSMENT — TONOMETRY
OS_IOP_MMHG: 20
OD_IOP_MMHG: 18

## 2023-11-07 ENCOUNTER — COMPREHENSIVE EXAM (OUTPATIENT)
Dept: URBAN - METROPOLITAN AREA CLINIC 46 | Facility: CLINIC | Age: 73
End: 2023-11-07

## 2023-11-07 DIAGNOSIS — Z79.4: ICD-10-CM

## 2023-11-07 DIAGNOSIS — H04.123: ICD-10-CM

## 2023-11-07 DIAGNOSIS — H40.1131: ICD-10-CM

## 2023-11-07 DIAGNOSIS — H43.12: ICD-10-CM

## 2023-11-07 DIAGNOSIS — H52.7: ICD-10-CM

## 2023-11-07 DIAGNOSIS — E11.3513: ICD-10-CM

## 2023-11-07 DIAGNOSIS — H33.41: ICD-10-CM

## 2023-11-07 DIAGNOSIS — H35.371: ICD-10-CM

## 2023-11-07 PROCEDURE — 92014 COMPRE OPH EXAM EST PT 1/>: CPT

## 2023-11-07 PROCEDURE — 92015 DETERMINE REFRACTIVE STATE: CPT

## 2023-11-07 ASSESSMENT — VISUAL ACUITY
OD_SC: 20/100
OD_CC: 20/40
OD_CC: J5
OS_SC: J8
OS_CC: J2
OS_CC: 20/25
OD_SC: J10
OS_SC: 20/30

## 2023-11-07 NOTE — PATIENT DISCUSSION
"Chief Complaint  Hypertension and Hyperlipidemia    Subjective        Elysia Babcock presents to [facility]  History of Present Illness  She is here today for a follow-up for a recent diagnosis of pneumonia.  She is  and lives with her son and daughter-in-law.  She spent quite a bit of her adult life in [place].  She was a  and she is now retired.  She has obesity, coronary artery disease status post stent x1 in 2017, major depression, hypertension, hyperlipidemia and vitamin D deficiency. Her dad had cad.       The patient has no other complaints today and denies chest pain, weakness, numbness, nausea, vomiting, diarrhea, dizziness or syncopal event.        Objective   Vital Signs:  /94 (BP Location: Left arm, Patient Position: Sitting, Cuff Size: Adult)   Pulse 101   Temp 97.4 °F (36.3 °C) (Tympanic)   Resp 18   Ht 167.6 cm (65.98\")   Wt 94.6 kg (208 lb 9.6 oz)   SpO2 100%   BMI 33.69 kg/m²   Estimated body mass index is 33.69 kg/m² as calculated from the following:    Height as of this encounter: 167.6 cm (65.98\").    Weight as of this encounter: 94.6 kg (208 lb 9.6 oz).               Physical Exam  Vitals reviewed.   Constitutional:       Appearance: She is well-developed. She is obese.   HENT:      Head: Normocephalic and atraumatic.      Right Ear: External ear normal.      Left Ear: External ear normal.      Mouth/Throat:      Pharynx: No oropharyngeal exudate.   Eyes:      Conjunctiva/sclera: Conjunctivae normal.      Pupils: Pupils are equal, round, and reactive to light.   Neck:      Vascular: No carotid bruit.   Cardiovascular:      Rate and Rhythm: Normal rate and regular rhythm.      Heart sounds: No murmur heard.     No friction rub. No gallop.   Pulmonary:      Effort: Pulmonary effort is normal.      Breath sounds: Normal breath sounds. No wheezing or rhonchi.   Abdominal:      General: There is no distension.   Skin:     General: Skin " Symptoms of Retinal tear and detachment reviewed. Patient understands to call immediately with any such symptoms. is warm and dry.   Neurological:      Mental Status: She is alert and oriented to person, place, and time.      Cranial Nerves: No cranial nerve deficit.      Motor: No weakness.   Psychiatric:         Mood and Affect: Mood and affect normal.         Behavior: Behavior normal.         Thought Content: Thought content normal.         Judgment: Judgment normal.        Result Review :    CMP          6/5/2023    09:35 10/13/2023    16:34   CMP   Glucose 95     BUN 22     Creatinine 0.89     EGFR 70.7     Sodium 139     Potassium 4.7  4.1    Chloride 104     Calcium 9.4     Total Protein 6.7     Albumin 4.4     Globulin 2.3     Total Bilirubin 0.6     Alkaline Phosphatase 61     AST (SGOT) 22     ALT (SGPT) 18     Albumin/Globulin Ratio 1.9     BUN/Creatinine Ratio 24.7     Anion Gap 7.0       CBC          6/5/2023    09:35 10/13/2023    16:34   CBC   WBC 7.55  6.91    RBC 4.46  3.71    Hemoglobin 14.0  11.8    Hematocrit 41.6  34.9    MCV 93.3  94.1    MCH 31.4  31.8    MCHC 33.7  33.8    RDW 13.5  13.6    Platelets 156  117      Lipid Panel          6/5/2023    09:35   Lipid Panel   Total Cholesterol 230    Triglycerides 62    HDL Cholesterol 69    VLDL Cholesterol 11    LDL Cholesterol  150    LDL/HDL Ratio 2.15      TSH          6/5/2023    09:35   TSH   TSH 2.250                   Assessment and Plan   Diagnoses and all orders for this visit:    1. Primary hypertension (Primary)  Assessment & Plan:  Hypertension is improving with treatment.  Continue current treatment regimen.  Dietary sodium restriction.  Weight loss.  Blood pressure will be reassessed at the next regular appointment.      2. Mixed hyperlipidemia  Assessment & Plan:  Lipid abnormalities are improving with lifestyle modifications.  Nutritional counseling was provided.  Lipids will be reassessed in 6 months.    The 10-year ASCVD risk score (Reagan DK, et al., 2019) is: 8.6%    Values used to calculate the score:      Age: 71 years      Sex: Female       Is Non- : No      Diabetic: No      Tobacco smoker: No      Systolic Blood Pressure: 111 mmHg      Is BP treated: Yes      HDL Cholesterol: 69 mg/dL      Total Cholesterol: 230 mg/dL        3. Class 1 obesity due to excess calories with serious comorbidity and body mass index (BMI) of 33.0 to 33.9 in adult  Assessment & Plan:  Patient's (Body mass index is 33.69 kg/m².) indicates that they are obese (BMI >30) with health conditions that include hypertension and dyslipidemias . Weight is worsening. BMI  is above average; BMI management plan is completed. We discussed low calorie, low carb based diet program, portion control, and increasing exercise.       4. Attention deficit  -     buPROPion XL (Wellbutrin XL) 150 MG 24 hr tablet; Take 1 tablet by mouth Daily.    5. Osteopenia of multiple sites  -     alendronate (Fosamax) 70 MG tablet; Take 1 tablet by mouth Every 7 (Seven) Days.             Follow Up   Return in about 6 months (around 5/7/2024).  Patient was given instructions and counseling regarding her condition or for health maintenance advice. Please see specific information pulled into the AVS if appropriate.         Answers submitted by the patient for this visit:  Other (Submitted on 11/6/2023)  Please describe your symptoms.: This is a six-month follow-up visit.  Have you had these symptoms before?: No  How long have you been having these symptoms?: 1-4 days  Please list any medications you are currently taking for this condition.: NA  Please describe any probable cause for these symptoms. : NA  Primary Reason for Visit (Submitted on 11/6/2023)  What is the primary reason for your visit?: Other

## 2023-12-12 ENCOUNTER — CLINIC PROCEDURE ONLY (OUTPATIENT)
Dept: URBAN - METROPOLITAN AREA CLINIC 46 | Facility: CLINIC | Age: 73
End: 2023-12-12

## 2023-12-12 DIAGNOSIS — E11.3513: ICD-10-CM

## 2023-12-12 DIAGNOSIS — Z79.4: ICD-10-CM

## 2023-12-12 PROCEDURE — 67028 INJECTION EYE DRUG: CPT

## 2023-12-12 PROCEDURE — 92250 FUNDUS PHOTOGRAPHY W/I&R: CPT

## 2023-12-12 ASSESSMENT — TONOMETRY
OD_IOP_MMHG: 16
OS_IOP_MMHG: 18

## 2023-12-12 ASSESSMENT — VISUAL ACUITY
OD_CC: 20/40+1
OS_CC: 20/25-1

## 2024-02-20 ENCOUNTER — PREPPED CHART (OUTPATIENT)
Dept: URBAN - METROPOLITAN AREA CLINIC 46 | Facility: CLINIC | Age: 74
End: 2024-02-20

## 2024-05-21 ENCOUNTER — PREPPED CHART (OUTPATIENT)
Dept: URBAN - METROPOLITAN AREA CLINIC 46 | Facility: CLINIC | Age: 74
End: 2024-05-21

## 2024-08-05 NOTE — PATIENT DISCUSSION
Indications, risks, benefits and alternatives to YAG capsulotomy discussed with patient. Questions answered. Educational handout given. Sh[place]a, Pharmacy technician called to clarify if carbidopa-levodopa IR  mg will replace the 6AM rytary dose. She transferred me to [place], pharmacist. Clarified carbidopa-levodopa IR  will be ADDED at 6 AM to take with rytary (not replacing it).